# Patient Record
Sex: FEMALE | Race: OTHER
[De-identification: names, ages, dates, MRNs, and addresses within clinical notes are randomized per-mention and may not be internally consistent; named-entity substitution may affect disease eponyms.]

---

## 2022-08-29 ENCOUNTER — HOSPITAL ENCOUNTER (EMERGENCY)
Dept: HOSPITAL 12 - ER | Age: 67
LOS: 1 days | Discharge: LEFT BEFORE DISCHARGE | End: 2022-08-30
Payer: SELF-PAY

## 2022-08-29 DIAGNOSIS — Z53.21: Primary | ICD-10-CM

## 2022-08-30 NOTE — NUR
Patient was called to be triaged but was not present in the ER. PATIENT WAS NOT 
TRIAGED OR SEEN BY ERMD.